# Patient Record
Sex: FEMALE | Race: WHITE | Employment: UNEMPLOYED | URBAN - METROPOLITAN AREA
[De-identification: names, ages, dates, MRNs, and addresses within clinical notes are randomized per-mention and may not be internally consistent; named-entity substitution may affect disease eponyms.]

---

## 2018-03-01 PROCEDURE — 86003 ALLG SPEC IGE CRUDE XTRC EA: CPT | Performed by: INTERNAL MEDICINE

## 2018-03-01 PROCEDURE — 36415 COLL VENOUS BLD VENIPUNCTURE: CPT | Performed by: INTERNAL MEDICINE

## 2018-03-13 PROBLEM — I10 ESSENTIAL HYPERTENSION: Status: ACTIVE | Noted: 2018-03-13

## 2018-07-16 PROCEDURE — 81241 F5 GENE: CPT | Performed by: INTERNAL MEDICINE

## 2018-07-16 PROCEDURE — 81240 F2 GENE: CPT | Performed by: INTERNAL MEDICINE

## 2018-07-16 PROCEDURE — 85705 THROMBOPLASTIN INHIBITION: CPT | Performed by: INTERNAL MEDICINE

## 2018-07-16 PROCEDURE — 86147 CARDIOLIPIN ANTIBODY EA IG: CPT | Performed by: INTERNAL MEDICINE

## 2018-07-16 PROCEDURE — 85613 RUSSELL VIPER VENOM DILUTED: CPT | Performed by: INTERNAL MEDICINE

## 2018-07-16 PROCEDURE — 85732 THROMBOPLASTIN TIME PARTIAL: CPT | Performed by: INTERNAL MEDICINE

## 2018-07-16 PROCEDURE — 86146 BETA-2 GLYCOPROTEIN ANTIBODY: CPT | Performed by: INTERNAL MEDICINE

## 2018-07-16 PROCEDURE — 85610 PROTHROMBIN TIME: CPT | Performed by: INTERNAL MEDICINE

## 2018-07-30 PROBLEM — I82.4Y2 ACUTE DEEP VEIN THROMBOSIS (DVT) OF PROXIMAL VEIN OF LEFT LOWER EXTREMITY (HCC): Status: ACTIVE | Noted: 2018-07-30

## 2018-10-15 PROCEDURE — 85732 THROMBOPLASTIN TIME PARTIAL: CPT | Performed by: INTERNAL MEDICINE

## 2018-10-15 PROCEDURE — 85705 THROMBOPLASTIN INHIBITION: CPT | Performed by: INTERNAL MEDICINE

## 2018-10-15 PROCEDURE — 85610 PROTHROMBIN TIME: CPT | Performed by: INTERNAL MEDICINE

## 2018-10-15 PROCEDURE — 36415 COLL VENOUS BLD VENIPUNCTURE: CPT | Performed by: INTERNAL MEDICINE

## 2018-10-15 PROCEDURE — 85613 RUSSELL VIPER VENOM DILUTED: CPT | Performed by: INTERNAL MEDICINE

## 2018-10-18 ENCOUNTER — HOSPITAL ENCOUNTER (OUTPATIENT)
Facility: HOSPITAL | Age: 53
Setting detail: HOSPITAL OUTPATIENT SURGERY
Discharge: HOME | End: 2018-10-18
Attending: SPECIALIST | Admitting: SPECIALIST
Payer: COMMERCIAL

## 2018-10-18 ENCOUNTER — ANESTHESIA EVENT (OUTPATIENT)
Dept: ENDOSCOPY | Facility: HOSPITAL | Age: 53
End: 2018-10-18

## 2018-10-18 ENCOUNTER — ANESTHESIA (OUTPATIENT)
Dept: ENDOSCOPY | Facility: HOSPITAL | Age: 53
End: 2018-10-18

## 2018-10-18 VITALS
DIASTOLIC BLOOD PRESSURE: 60 MMHG | TEMPERATURE: 98 F | HEART RATE: 69 BPM | WEIGHT: 293 LBS | HEIGHT: 68 IN | BODY MASS INDEX: 44.41 KG/M2 | OXYGEN SATURATION: 97 % | SYSTOLIC BLOOD PRESSURE: 100 MMHG | RESPIRATION RATE: 18 BRPM

## 2018-10-18 DIAGNOSIS — Z12.11 SCREEN FOR COLON CANCER: ICD-10-CM

## 2018-10-18 PROCEDURE — 88305 TISSUE EXAM BY PATHOLOGIST: CPT | Performed by: SPECIALIST

## 2018-10-18 PROCEDURE — 0DBN8ZX EXCISION OF SIGMOID COLON, VIA NATURAL OR ARTIFICIAL OPENING ENDOSCOPIC, DIAGNOSTIC: ICD-10-PCS | Performed by: SPECIALIST

## 2018-10-18 PROCEDURE — 0DBM8ZX EXCISION OF DESCENDING COLON, VIA NATURAL OR ARTIFICIAL OPENING ENDOSCOPIC, DIAGNOSTIC: ICD-10-PCS | Performed by: SPECIALIST

## 2018-10-18 RX ORDER — SODIUM CHLORIDE, SODIUM LACTATE, POTASSIUM CHLORIDE, CALCIUM CHLORIDE 600; 310; 30; 20 MG/100ML; MG/100ML; MG/100ML; MG/100ML
INJECTION, SOLUTION INTRAVENOUS CONTINUOUS
Status: DISCONTINUED | OUTPATIENT
Start: 2018-10-18 | End: 2018-10-18

## 2018-10-18 RX ORDER — NALOXONE HYDROCHLORIDE 0.4 MG/ML
80 INJECTION, SOLUTION INTRAMUSCULAR; INTRAVENOUS; SUBCUTANEOUS AS NEEDED
Status: DISCONTINUED | OUTPATIENT
Start: 2018-10-18 | End: 2018-10-18

## 2018-10-18 RX ORDER — ONDANSETRON 2 MG/ML
4 INJECTION INTRAMUSCULAR; INTRAVENOUS AS NEEDED
Status: DISCONTINUED | OUTPATIENT
Start: 2018-10-18 | End: 2018-10-18

## 2018-10-18 NOTE — BRIEF OP NOTE
Pre-Operative Diagnosis: Screen for colon cancer [Z12.11]     Post-Operative Diagnosis: colon polyps, diverticulosis, hemorrhoids      Procedure Performed:   Procedure(s):  colonoscopy with polpectomy    Surgeon(s) and Role:     Brandt Negro MD - Bessy Berrios

## 2018-10-18 NOTE — ANESTHESIA POSTPROCEDURE EVALUATION
[address] Patient Status:  Hospital Outpatient Surgery   Age/Gender 46year old female MRN [id number]   Location [address] [facility]. Attending Sae Stover MD   Hosp Day # 0 PCP Cesilia Saravia MD       Anesthesia Post-op

## 2018-10-18 NOTE — H&P
[address] Patient Status:  Hospital Outpatient Surgery    1965 MRN [id number]   [facility] ENDOSCOPY Attending Mandeep Blackwood MD   Hosp Day # 0 PCP Venkatesh Cuevas MD     Active Problems:    * No active hosp

## 2018-10-18 NOTE — ANESTHESIA PREPROCEDURE EVALUATION
PRE-OP EVALUATION    Patient Name: James Johnson    Pre-op Diagnosis: Screen for colon cancer [Z12.11]    Procedure(s):  colonoscopy    Surgeon(s) and Role:     Kiel Maxwell MD - Primary    Pre-op vitals reviewed.   Temp: 97.7 °F (36.5 °C)  Pulse: 1999    Heel spur removed left foot   • OTHER SURGICAL HISTORY Left 5/30/12    Left knee A&E w/PLM   • REPAIR ELBOW SAMANTHA/ATTCH OPEN Left 10/22/2015    Procedure: DEBRIDEMENT LATERAL EPICONDYLE  ELBOW;  Surgeon: Rhonda Adame MD;  Location: [facility]

## 2018-10-18 NOTE — OPERATIVE REPORT
ENDOSCOPY OPERATIVE REPORT    Patient Name:  Linh Donald Record #: [id number]  YOB: 1965  Date of Procedure: 10/18/2018    Preoperative Diagnosis: Screening    Postoperative Diagnosis:Colon Polyps precautions. IMPRESSION:  Colon Polyps  Diverticulosis  Hemorrhoids    PLAN:  Await Biopsy Results  Patient is to follow a high fiber, low fat diet     Mani Portillo.  Blanquita Stone MD

## 2018-10-21 NOTE — PROGRESS NOTES
10/21/2018  Dorcus Click Theresa  [address] [address]    Dear Maddy Rodriguez,       Here are the  biopsy/pathology findings from your recent Colonoscopy : Three small polyps were completely removed at the time of your colonoscopy.   These

## 2018-11-05 PROBLEM — G43.109 MIGRAINE WITH AURA AND WITHOUT STATUS MIGRAINOSUS, NOT INTRACTABLE: Status: ACTIVE | Noted: 2018-11-05

## 2018-12-07 PROBLEM — M17.12 PRIMARY OSTEOARTHRITIS OF LEFT KNEE: Status: ACTIVE | Noted: 2018-12-07

## 2019-01-24 PROCEDURE — 85305 CLOT INHIBIT PROT S TOTAL: CPT | Performed by: INTERNAL MEDICINE

## 2019-01-24 PROCEDURE — 85301 ANTITHROMBIN III ANTIGEN: CPT | Performed by: INTERNAL MEDICINE

## 2019-01-24 PROCEDURE — 85306 CLOT INHIBIT PROT S FREE: CPT | Performed by: INTERNAL MEDICINE

## 2019-01-24 PROCEDURE — 85303 CLOT INHIBIT PROT C ACTIVITY: CPT | Performed by: INTERNAL MEDICINE

## 2019-01-29 PROCEDURE — 86141 C-REACTIVE PROTEIN HS: CPT | Performed by: INTERNAL MEDICINE

## 2019-01-29 PROCEDURE — 82397 CHEMILUMINESCENT ASSAY: CPT | Performed by: INTERNAL MEDICINE

## 2019-03-05 PROCEDURE — 88175 CYTOPATH C/V AUTO FLUID REDO: CPT | Performed by: OBSTETRICS & GYNECOLOGY

## 2019-03-05 PROCEDURE — 87624 HPV HI-RISK TYP POOLED RSLT: CPT | Performed by: OBSTETRICS & GYNECOLOGY

## 2019-03-15 PROCEDURE — 88305 TISSUE EXAM BY PATHOLOGIST: CPT | Performed by: RADIOLOGY

## 2019-03-15 PROCEDURE — 88342 IMHCHEM/IMCYTCHM 1ST ANTB: CPT | Performed by: RADIOLOGY

## 2019-03-15 PROCEDURE — 88341 IMHCHEM/IMCYTCHM EA ADD ANTB: CPT | Performed by: RADIOLOGY

## 2019-04-15 ENCOUNTER — TELEPHONE (OUTPATIENT)
Dept: MAMMOGRAPHY | Facility: HOSPITAL | Age: 54
End: 2019-04-15

## 2019-04-15 NOTE — TELEPHONE ENCOUNTER
Keegan Messina called this RN back regarding needle localization process of breast for lumpectomy scheduled for Tuesday April 23, 2019 with Dr. Li March. Procedure explained and all questions answered.  Pt to be transported via W/C through [facility]

## 2019-04-23 ENCOUNTER — HOSPITAL ENCOUNTER (OUTPATIENT)
Facility: HOSPITAL | Age: 54
Setting detail: HOSPITAL OUTPATIENT SURGERY
Discharge: HOME | End: 2019-04-23
Attending: SURGERY | Admitting: SURGERY
Payer: COMMERCIAL

## 2019-04-23 ENCOUNTER — ANESTHESIA (OUTPATIENT)
Dept: SURGERY | Facility: HOSPITAL | Age: 54
End: 2019-04-23
Payer: COMMERCIAL

## 2019-04-23 ENCOUNTER — HOSPITAL ENCOUNTER (OUTPATIENT)
Dept: MAMMOGRAPHY | Facility: HOSPITAL | Age: 54
Discharge: HOME | End: 2019-04-23
Attending: SURGERY
Payer: COMMERCIAL

## 2019-04-23 ENCOUNTER — ANESTHESIA EVENT (OUTPATIENT)
Dept: SURGERY | Facility: HOSPITAL | Age: 54
End: 2019-04-23
Payer: COMMERCIAL

## 2019-04-23 VITALS
RESPIRATION RATE: 18 BRPM | SYSTOLIC BLOOD PRESSURE: 125 MMHG | BODY MASS INDEX: 44.41 KG/M2 | OXYGEN SATURATION: 100 % | DIASTOLIC BLOOD PRESSURE: 60 MMHG | WEIGHT: 293 LBS | TEMPERATURE: 98 F | HEIGHT: 68 IN | HEART RATE: 68 BPM

## 2019-04-23 DIAGNOSIS — R92.0 BREAST MICROCALCIFICATIONS: ICD-10-CM

## 2019-04-23 PROCEDURE — 81025 URINE PREGNANCY TEST: CPT | Performed by: SURGERY

## 2019-04-23 PROCEDURE — 19281 PERQ DEVICE BREAST 1ST IMAG: CPT | Performed by: SURGERY

## 2019-04-23 PROCEDURE — 0HBU0ZX EXCISION OF LEFT BREAST, OPEN APPROACH, DIAGNOSTIC: ICD-10-PCS | Performed by: SURGERY

## 2019-04-23 PROCEDURE — 76098 X-RAY EXAM SURGICAL SPECIMEN: CPT | Performed by: SURGERY

## 2019-04-23 PROCEDURE — 88307 TISSUE EXAM BY PATHOLOGIST: CPT | Performed by: SURGERY

## 2019-04-23 PROCEDURE — 19282 PERQ DEVICE BREAST EA IMAG: CPT | Performed by: SURGERY

## 2019-04-23 RX ORDER — DIAZEPAM 5 MG/1
5 TABLET ORAL AS NEEDED
Status: DISCONTINUED | OUTPATIENT
Start: 2019-04-23 | End: 2019-04-23 | Stop reason: HOSPADM

## 2019-04-23 RX ORDER — ONDANSETRON 2 MG/ML
INJECTION INTRAMUSCULAR; INTRAVENOUS
Status: DISCONTINUED | OUTPATIENT
Start: 2019-04-23 | End: 2019-04-23 | Stop reason: HOSPADM

## 2019-04-23 RX ORDER — ONDANSETRON 2 MG/ML
4 INJECTION INTRAMUSCULAR; INTRAVENOUS AS NEEDED
Status: DISCONTINUED | OUTPATIENT
Start: 2019-04-23 | End: 2019-04-23

## 2019-04-23 RX ORDER — HYDROMORPHONE HYDROCHLORIDE 1 MG/ML
0.4 INJECTION, SOLUTION INTRAMUSCULAR; INTRAVENOUS; SUBCUTANEOUS EVERY 5 MIN PRN
Status: DISCONTINUED | OUTPATIENT
Start: 2019-04-23 | End: 2019-04-23

## 2019-04-23 RX ORDER — HYDROCODONE BITARTRATE AND ACETAMINOPHEN 5; 325 MG/1; MG/1
1-2 TABLET ORAL EVERY 6 HOURS PRN
Qty: 20 TABLET | Refills: 0 | Status: SHIPPED | OUTPATIENT
Start: 2019-04-23 | End: 2019-04-26 | Stop reason: ALTCHOICE

## 2019-04-23 RX ORDER — SODIUM CHLORIDE, SODIUM LACTATE, POTASSIUM CHLORIDE, CALCIUM CHLORIDE 600; 310; 30; 20 MG/100ML; MG/100ML; MG/100ML; MG/100ML
INJECTION, SOLUTION INTRAVENOUS CONTINUOUS
Status: DISCONTINUED | OUTPATIENT
Start: 2019-04-23 | End: 2019-04-23

## 2019-04-23 RX ORDER — HYDROCODONE BITARTRATE AND ACETAMINOPHEN 5; 325 MG/1; MG/1
2 TABLET ORAL AS NEEDED
Status: DISCONTINUED | OUTPATIENT
Start: 2019-04-23 | End: 2019-04-23

## 2019-04-23 RX ORDER — MIDAZOLAM HYDROCHLORIDE 1 MG/ML
1 INJECTION INTRAMUSCULAR; INTRAVENOUS EVERY 5 MIN PRN
Status: DISCONTINUED | OUTPATIENT
Start: 2019-04-23 | End: 2019-04-23

## 2019-04-23 RX ORDER — NALOXONE HYDROCHLORIDE 0.4 MG/ML
80 INJECTION, SOLUTION INTRAMUSCULAR; INTRAVENOUS; SUBCUTANEOUS AS NEEDED
Status: DISCONTINUED | OUTPATIENT
Start: 2019-04-23 | End: 2019-04-23

## 2019-04-23 RX ORDER — METOCLOPRAMIDE HYDROCHLORIDE 5 MG/ML
INJECTION INTRAMUSCULAR; INTRAVENOUS
Status: DISCONTINUED | OUTPATIENT
Start: 2019-04-23 | End: 2019-04-23 | Stop reason: HOSPADM

## 2019-04-23 RX ORDER — ACETAMINOPHEN 500 MG
1000 TABLET ORAL ONCE
COMMUNITY
End: 2019-12-05 | Stop reason: ALTCHOICE

## 2019-04-23 RX ORDER — SCOLOPAMINE TRANSDERMAL SYSTEM 1 MG/1
1 PATCH, EXTENDED RELEASE TRANSDERMAL
Status: DISCONTINUED | OUTPATIENT
Start: 2019-04-23 | End: 2019-04-23

## 2019-04-23 RX ORDER — ACETAMINOPHEN 500 MG
1000 TABLET ORAL ONCE
Status: DISCONTINUED | OUTPATIENT
Start: 2019-04-23 | End: 2019-04-23

## 2019-04-23 RX ORDER — BUPIVACAINE HYDROCHLORIDE 5 MG/ML
INJECTION, SOLUTION EPIDURAL; INTRACAUDAL AS NEEDED
Status: DISCONTINUED | OUTPATIENT
Start: 2019-04-23 | End: 2019-04-23 | Stop reason: HOSPADM

## 2019-04-23 RX ORDER — HYDROCODONE BITARTRATE AND ACETAMINOPHEN 5; 325 MG/1; MG/1
1 TABLET ORAL AS NEEDED
Status: DISCONTINUED | OUTPATIENT
Start: 2019-04-23 | End: 2019-04-23

## 2019-04-23 RX ORDER — DEXAMETHASONE SODIUM PHOSPHATE 4 MG/ML
VIAL (ML) INJECTION
Status: DISCONTINUED | OUTPATIENT
Start: 2019-04-23 | End: 2019-04-23 | Stop reason: HOSPADM

## 2019-04-23 NOTE — BRIEF OP NOTE
Pre-Operative Diagnosis: Breast microcalcifications [R92.0]     Post-Operative Diagnosis: SAME AS ABOVE     Procedure Performed:   LEFT BREAST NEEDLE LOCALIZATION LUMPECTOMY TIMES TWO SITES AND USE OF FAXITRON    Surgeon(s) and Role:     Elizabeth Ceron,

## 2019-04-23 NOTE — OPERATIVE REPORT
[address]    PATIENT'S NAME: Mary Grace Connolly   ATTENDING PHYSICIAN: José Antonio Coughlin M.D. OPERATING PHYSICIAN: José Antonio Coughlin M.D.    PATIENT ACCOUNT#:   [de-identified]    LOCATION:  [address] [place] [address]  MEDICAL RECORD #:   [id number]       DATE breast was fatty and most likely 1 of the clips was dislodged as it was being removed, and both wires were in place within the tissue. In the lumpectomy cavity the small bleeders were cauterized.   Good hemostasis noted, and after irrigation was complete,

## 2019-04-23 NOTE — ANESTHESIA POSTPROCEDURE EVALUATION
[address] Patient Status:  Hospital Outpatient Surgery   Age/Gender 53year old female MRN [id number]   [facility] SURGERY Attending Marek Ortiz MD   Hosp Day # 0 PCP Corrinne Endow, MD       Anesthesia Post-op N

## 2019-04-23 NOTE — IMAGING NOTE
Assisted Dr. Ming Cid with 2 site LB needle localization for breast for lumpectomy. Procedure explained and all questions answered. Pt verbalized understanding. Emotional support provided and pt tolerated procedure well with minimal discomfort.  Gauze dressin

## 2019-04-23 NOTE — ANESTHESIA PREPROCEDURE EVALUATION
PRE-OP EVALUATION    Patient Name: Kieran Hale    Pre-op Diagnosis: LEFT BREAST MICROCALCIFICATIONS    Procedure(s):  LEFT BREAST NEEDLE LOC LUMPECTOMIES X 2    Surgeon(s) and Role:     Javon Cohn MD - Primary    Pre-op vitals reviewed.   Temp: (+) hyperlipidemia                                  Endo/Other                     (+) clotting disorder (lupus anticoagulant)      (+) arthritis       Pulmonary  Comment: Former smoker               Recent URI: chronic rhintis.    (+) sleep apnea and CPAP Pulmonary      Breath sounds clear to auscultation bilaterally. (+) decreased breath sounds         Other findings  ANXIOUS/NAUSEOUS  Vasovagal w IV placement          ASA: 3   Plan: general  NPO status verified and patient meets guidelines.         C

## 2019-04-23 NOTE — H&P
The above referenced H&P in the office on 4/8/2019 by Dr. Davi Joaquin was reviewed by Enedina Jones MD on 4/23/2019, the patient was examined and no significant changes have occurred in the patient's condition since the H&P was performed.   Risks and benefits were

## 2020-03-24 PROBLEM — D68.62 LUPUS ANTICOAGULANT DISORDER (HCC): Status: ACTIVE | Noted: 2020-03-24

## 2020-05-13 PROCEDURE — 88305 TISSUE EXAM BY PATHOLOGIST: CPT | Performed by: RADIOLOGY

## 2020-08-28 PROCEDURE — 88305 TISSUE EXAM BY PATHOLOGIST: CPT | Performed by: RADIOLOGY

## 2020-08-28 PROCEDURE — 88344 IMHCHEM/IMCYTCHM EA MLT ANTB: CPT | Performed by: RADIOLOGY

## 2021-08-02 PROBLEM — N20.0 LEFT RENAL STONE: Status: ACTIVE | Noted: 2021-08-02

## 2022-04-11 ENCOUNTER — HOSPITAL ENCOUNTER (OUTPATIENT)
Dept: PHYSICAL THERAPY | Facility: HOSPITAL | Age: 57
Discharge: HOME | End: 2022-04-11
Attending: ORTHOPAEDIC SURGERY
Payer: COMMERCIAL

## 2022-04-11 ENCOUNTER — HOSPITAL ENCOUNTER (OUTPATIENT)
Dept: LAB | Facility: HOSPITAL | Age: 57
Discharge: HOME | End: 2022-04-11
Attending: ORTHOPAEDIC SURGERY
Payer: COMMERCIAL

## 2022-04-11 DIAGNOSIS — M17.12 OSTEOARTHRITIS OF LEFT KNEE: ICD-10-CM

## 2022-04-11 LAB
ANTIBODY SCREEN: NEGATIVE
RH BLOOD TYPE: NEGATIVE

## 2022-04-11 PROCEDURE — 86900 BLOOD TYPING SEROLOGIC ABO: CPT | Performed by: ORTHOPAEDIC SURGERY

## 2022-04-11 PROCEDURE — 87081 CULTURE SCREEN ONLY: CPT | Performed by: ORTHOPAEDIC SURGERY

## 2022-04-11 PROCEDURE — 86901 BLOOD TYPING SEROLOGIC RH(D): CPT | Performed by: ORTHOPAEDIC SURGERY

## 2022-04-11 PROCEDURE — 86850 RBC ANTIBODY SCREEN: CPT | Performed by: ORTHOPAEDIC SURGERY

## 2022-04-19 ENCOUNTER — LAB ENCOUNTER (OUTPATIENT)
Dept: LAB | Facility: HOSPITAL | Age: 57
End: 2022-04-19
Attending: ORTHOPAEDIC SURGERY
Payer: COMMERCIAL

## 2022-04-19 DIAGNOSIS — M17.12 OSTEOARTHRITIS OF LEFT KNEE: ICD-10-CM

## 2022-04-19 LAB — SARS-COV-2 RNA RESP QL NAA+PROBE: NOT DETECTED

## 2022-04-20 RX ORDER — TRANEXAMIC ACID 10 MG/ML
1000 INJECTION, SOLUTION INTRAVENOUS
Status: DISCONTINUED | OUTPATIENT
Start: 2022-04-21 | End: 2022-04-21

## 2022-04-21 ENCOUNTER — HOSPITAL ENCOUNTER (OUTPATIENT)
Facility: HOSPITAL | Age: 57
Discharge: HOME HEALTH | End: 2022-04-22
Attending: ORTHOPAEDIC SURGERY | Admitting: ORTHOPAEDIC SURGERY
Payer: COMMERCIAL

## 2022-04-21 ENCOUNTER — APPOINTMENT (OUTPATIENT)
Dept: GENERAL RADIOLOGY | Facility: HOSPITAL | Age: 57
End: 2022-04-21
Attending: ORTHOPAEDIC SURGERY
Payer: COMMERCIAL

## 2022-04-21 ENCOUNTER — ANESTHESIA EVENT (OUTPATIENT)
Dept: SURGERY | Facility: HOSPITAL | Age: 57
End: 2022-04-21
Payer: COMMERCIAL

## 2022-04-21 ENCOUNTER — ANESTHESIA (OUTPATIENT)
Dept: SURGERY | Facility: HOSPITAL | Age: 57
End: 2022-04-21
Payer: COMMERCIAL

## 2022-04-21 DIAGNOSIS — M17.12 OSTEOARTHRITIS OF LEFT KNEE: Primary | ICD-10-CM

## 2022-04-21 DIAGNOSIS — M17.12 PRIMARY OSTEOARTHRITIS OF LEFT KNEE: ICD-10-CM

## 2022-04-21 PROCEDURE — 0SRD0J9 REPLACEMENT OF LEFT KNEE JOINT WITH SYNTHETIC SUBSTITUTE, CEMENTED, OPEN APPROACH: ICD-10-PCS | Performed by: ORTHOPAEDIC SURGERY

## 2022-04-21 PROCEDURE — 76942 ECHO GUIDE FOR BIOPSY: CPT | Performed by: STUDENT IN AN ORGANIZED HEALTH CARE EDUCATION/TRAINING PROGRAM

## 2022-04-21 PROCEDURE — 73560 X-RAY EXAM OF KNEE 1 OR 2: CPT | Performed by: ORTHOPAEDIC SURGERY

## 2022-04-21 DEVICE — ATTUNE PATELLA MEDIALIZED ANATOMIC 32MM CEMENTED AOX
Type: IMPLANTABLE DEVICE | Site: KNEE | Status: FUNCTIONAL
Brand: ATTUNE

## 2022-04-21 DEVICE — SMARTSET GHV GENTAMICIN HIGH VISCOSITY BONE CEMENT 40G
Type: IMPLANTABLE DEVICE | Site: KNEE | Status: FUNCTIONAL
Brand: SMARTSET

## 2022-04-21 DEVICE — ATTUNE KNEE SYSTEM TIBIAL INSERT ROTATING PLATFORM POSTERIOR STABILIZED 5 7MM AOX
Type: IMPLANTABLE DEVICE | Site: KNEE | Status: FUNCTIONAL
Brand: ATTUNE

## 2022-04-21 DEVICE — ATTUNE KNEE SYSTEM TIBIAL BASE ROTATING PLATFORM SIZE 5 CEMENTED
Type: IMPLANTABLE DEVICE | Site: KNEE | Status: FUNCTIONAL
Brand: ATTUNE

## 2022-04-21 DEVICE — ATTUNE KNEE SYSTEM FEMORAL POSTERIOR STABILIZED SIZE 5 LEFT CEMENTED
Type: IMPLANTABLE DEVICE | Site: KNEE | Status: FUNCTIONAL
Brand: ATTUNE

## 2022-04-21 RX ORDER — HYDROMORPHONE HYDROCHLORIDE 1 MG/ML
0.4 INJECTION, SOLUTION INTRAMUSCULAR; INTRAVENOUS; SUBCUTANEOUS EVERY 2 HOUR PRN
Status: DISCONTINUED | OUTPATIENT
Start: 2022-04-21 | End: 2022-04-22

## 2022-04-21 RX ORDER — DEXAMETHASONE SODIUM PHOSPHATE 4 MG/ML
VIAL (ML) INJECTION AS NEEDED
Status: DISCONTINUED | OUTPATIENT
Start: 2022-04-21 | End: 2022-04-21 | Stop reason: SURG

## 2022-04-21 RX ORDER — MELATONIN
325
Status: DISCONTINUED | OUTPATIENT
Start: 2022-04-22 | End: 2022-04-22

## 2022-04-21 RX ORDER — HYDROCODONE BITARTRATE AND ACETAMINOPHEN 5; 325 MG/1; MG/1
1 TABLET ORAL AS NEEDED
Status: DISCONTINUED | OUTPATIENT
Start: 2022-04-21 | End: 2022-04-21 | Stop reason: HOSPADM

## 2022-04-21 RX ORDER — POLYETHYLENE GLYCOL 3350 17 G/17G
17 POWDER, FOR SOLUTION ORAL DAILY PRN
Status: DISCONTINUED | OUTPATIENT
Start: 2022-04-21 | End: 2022-04-22

## 2022-04-21 RX ORDER — MIDAZOLAM HYDROCHLORIDE 1 MG/ML
1 INJECTION INTRAMUSCULAR; INTRAVENOUS EVERY 5 MIN PRN
Status: DISCONTINUED | OUTPATIENT
Start: 2022-04-21 | End: 2022-04-21 | Stop reason: HOSPADM

## 2022-04-21 RX ORDER — SENNOSIDES 8.6 MG
17.2 TABLET ORAL NIGHTLY
Status: DISCONTINUED | OUTPATIENT
Start: 2022-04-21 | End: 2022-04-22

## 2022-04-21 RX ORDER — TRAMADOL HYDROCHLORIDE 50 MG/1
50 TABLET ORAL EVERY 6 HOURS
Status: DISCONTINUED | OUTPATIENT
Start: 2022-04-21 | End: 2022-04-22

## 2022-04-21 RX ORDER — TRANEXAMIC ACID 10 MG/ML
1000 INJECTION, SOLUTION INTRAVENOUS ONCE
Status: DISCONTINUED | OUTPATIENT
Start: 2022-04-21 | End: 2022-04-21 | Stop reason: HOSPADM

## 2022-04-21 RX ORDER — KETOROLAC TROMETHAMINE 30 MG/ML
30 INJECTION, SOLUTION INTRAMUSCULAR; INTRAVENOUS EVERY 6 HOURS
Status: COMPLETED | OUTPATIENT
Start: 2022-04-21 | End: 2022-04-22

## 2022-04-21 RX ORDER — OXYCODONE HYDROCHLORIDE 5 MG/1
2.5 TABLET ORAL EVERY 4 HOURS PRN
Status: DISCONTINUED | OUTPATIENT
Start: 2022-04-21 | End: 2022-04-22

## 2022-04-21 RX ORDER — METOCLOPRAMIDE HYDROCHLORIDE 5 MG/ML
10 INJECTION INTRAMUSCULAR; INTRAVENOUS AS NEEDED
Status: DISCONTINUED | OUTPATIENT
Start: 2022-04-21 | End: 2022-04-21 | Stop reason: HOSPADM

## 2022-04-21 RX ORDER — BUPRENORPHINE HYDROCHLORIDE 0.32 MG/ML
INJECTION INTRAMUSCULAR; INTRAVENOUS AS NEEDED
Status: DISCONTINUED | OUTPATIENT
Start: 2022-04-21 | End: 2022-04-21 | Stop reason: SURG

## 2022-04-21 RX ORDER — BUPROPION HYDROCHLORIDE 100 MG/1
200 TABLET ORAL 2 TIMES DAILY
Status: DISCONTINUED | OUTPATIENT
Start: 2022-04-21 | End: 2022-04-22

## 2022-04-21 RX ORDER — DIPHENHYDRAMINE HYDROCHLORIDE 50 MG/ML
12.5 INJECTION INTRAMUSCULAR; INTRAVENOUS EVERY 4 HOURS PRN
Status: DISCONTINUED | OUTPATIENT
Start: 2022-04-21 | End: 2022-04-22

## 2022-04-21 RX ORDER — TIZANIDINE 2 MG/1
2 TABLET ORAL EVERY 6 HOURS PRN
Status: DISCONTINUED | OUTPATIENT
Start: 2022-04-21 | End: 2022-04-22

## 2022-04-21 RX ORDER — DOCUSATE SODIUM 100 MG/1
100 CAPSULE, LIQUID FILLED ORAL 2 TIMES DAILY
Status: DISCONTINUED | OUTPATIENT
Start: 2022-04-21 | End: 2022-04-22

## 2022-04-21 RX ORDER — DEXAMETHASONE SODIUM PHOSPHATE 10 MG/ML
INJECTION, SOLUTION INTRAMUSCULAR; INTRAVENOUS AS NEEDED
Status: DISCONTINUED | OUTPATIENT
Start: 2022-04-21 | End: 2022-04-21 | Stop reason: SURG

## 2022-04-21 RX ORDER — BUPIVACAINE HYDROCHLORIDE 2.5 MG/ML
INJECTION, SOLUTION EPIDURAL; INFILTRATION; INTRACAUDAL AS NEEDED
Status: DISCONTINUED | OUTPATIENT
Start: 2022-04-21 | End: 2022-04-21 | Stop reason: SURG

## 2022-04-21 RX ORDER — CEFAZOLIN SODIUM/WATER 2 G/20 ML
2 SYRINGE (ML) INTRAVENOUS ONCE
Status: COMPLETED | OUTPATIENT
Start: 2022-04-21 | End: 2022-04-21

## 2022-04-21 RX ORDER — ACETAMINOPHEN 500 MG
1000 TABLET ORAL ONCE
Status: DISCONTINUED | OUTPATIENT
Start: 2022-04-21 | End: 2022-04-21 | Stop reason: HOSPADM

## 2022-04-21 RX ORDER — HYDROMORPHONE HYDROCHLORIDE 1 MG/ML
INJECTION, SOLUTION INTRAMUSCULAR; INTRAVENOUS; SUBCUTANEOUS
Status: COMPLETED
Start: 2022-04-21 | End: 2022-04-21

## 2022-04-21 RX ORDER — HYDROMORPHONE HYDROCHLORIDE 1 MG/ML
0.2 INJECTION, SOLUTION INTRAMUSCULAR; INTRAVENOUS; SUBCUTANEOUS EVERY 2 HOUR PRN
Status: DISCONTINUED | OUTPATIENT
Start: 2022-04-21 | End: 2022-04-22

## 2022-04-21 RX ORDER — ZOLPIDEM TARTRATE 5 MG/1
5 TABLET ORAL NIGHTLY PRN
Status: DISCONTINUED | OUTPATIENT
Start: 2022-04-21 | End: 2022-04-22

## 2022-04-21 RX ORDER — BUPIVACAINE HYDROCHLORIDE 7.5 MG/ML
INJECTION, SOLUTION INTRASPINAL AS NEEDED
Status: DISCONTINUED | OUTPATIENT
Start: 2022-04-21 | End: 2022-04-21 | Stop reason: SURG

## 2022-04-21 RX ORDER — MIDAZOLAM HYDROCHLORIDE 1 MG/ML
INJECTION INTRAMUSCULAR; INTRAVENOUS
Status: COMPLETED
Start: 2022-04-21 | End: 2022-04-21

## 2022-04-21 RX ORDER — ONDANSETRON 2 MG/ML
INJECTION INTRAMUSCULAR; INTRAVENOUS AS NEEDED
Status: DISCONTINUED | OUTPATIENT
Start: 2022-04-21 | End: 2022-04-21 | Stop reason: SURG

## 2022-04-21 RX ORDER — ONDANSETRON 2 MG/ML
4 INJECTION INTRAMUSCULAR; INTRAVENOUS AS NEEDED
Status: DISCONTINUED | OUTPATIENT
Start: 2022-04-21 | End: 2022-04-21 | Stop reason: HOSPADM

## 2022-04-21 RX ORDER — HYDROCODONE BITARTRATE AND ACETAMINOPHEN 5; 325 MG/1; MG/1
2 TABLET ORAL AS NEEDED
Status: DISCONTINUED | OUTPATIENT
Start: 2022-04-21 | End: 2022-04-21 | Stop reason: HOSPADM

## 2022-04-21 RX ORDER — OXYCODONE HYDROCHLORIDE 5 MG/1
1 TABLET ORAL EVERY 4 HOURS PRN
COMMUNITY
Start: 2022-04-12

## 2022-04-21 RX ORDER — DEXAMETHASONE SODIUM PHOSPHATE 4 MG/ML
4 VIAL (ML) INJECTION AS NEEDED
Status: DISCONTINUED | OUTPATIENT
Start: 2022-04-21 | End: 2022-04-21 | Stop reason: HOSPADM

## 2022-04-21 RX ORDER — PROCHLORPERAZINE EDISYLATE 5 MG/ML
10 INJECTION INTRAMUSCULAR; INTRAVENOUS EVERY 6 HOURS PRN
Status: DISCONTINUED | OUTPATIENT
Start: 2022-04-21 | End: 2022-04-22

## 2022-04-21 RX ORDER — PSEUDOEPHEDRINE HCL 30 MG
100 TABLET ORAL 2 TIMES DAILY
COMMUNITY
Start: 2022-04-12

## 2022-04-21 RX ORDER — SODIUM PHOSPHATE, DIBASIC AND SODIUM PHOSPHATE, MONOBASIC 7; 19 G/133ML; G/133ML
1 ENEMA RECTAL ONCE AS NEEDED
Status: DISCONTINUED | OUTPATIENT
Start: 2022-04-21 | End: 2022-04-22

## 2022-04-21 RX ORDER — SODIUM CHLORIDE, SODIUM LACTATE, POTASSIUM CHLORIDE, CALCIUM CHLORIDE 600; 310; 30; 20 MG/100ML; MG/100ML; MG/100ML; MG/100ML
INJECTION, SOLUTION INTRAVENOUS CONTINUOUS
Status: DISCONTINUED | OUTPATIENT
Start: 2022-04-21 | End: 2022-04-22

## 2022-04-21 RX ORDER — ACETAMINOPHEN 500 MG
1000 TABLET ORAL ONCE AS NEEDED
Status: COMPLETED | OUTPATIENT
Start: 2022-04-21 | End: 2022-04-21

## 2022-04-21 RX ORDER — PSEUDOEPHED/ACETAMINOPH/DIPHEN 30MG-500MG
500 TABLET ORAL EVERY 6 HOURS PRN
COMMUNITY
Start: 2022-04-12

## 2022-04-21 RX ORDER — ROSUVASTATIN CALCIUM 20 MG/1
20 TABLET, COATED ORAL DAILY
Status: DISCONTINUED | OUTPATIENT
Start: 2022-04-21 | End: 2022-04-22

## 2022-04-21 RX ORDER — DIPHENHYDRAMINE HCL 25 MG
25 CAPSULE ORAL EVERY 4 HOURS PRN
Status: DISCONTINUED | OUTPATIENT
Start: 2022-04-21 | End: 2022-04-22

## 2022-04-21 RX ORDER — FERROUS SULFATE 325(65) MG
325 TABLET ORAL DAILY
COMMUNITY
Start: 2022-04-12

## 2022-04-21 RX ORDER — CEFAZOLIN SODIUM 1 G/3ML
INJECTION, POWDER, FOR SOLUTION INTRAMUSCULAR; INTRAVENOUS AS NEEDED
Status: DISCONTINUED | OUTPATIENT
Start: 2022-04-21 | End: 2022-04-21 | Stop reason: SURG

## 2022-04-21 RX ORDER — MEPERIDINE HYDROCHLORIDE 25 MG/ML
12.5 INJECTION INTRAMUSCULAR; INTRAVENOUS; SUBCUTANEOUS AS NEEDED
Status: DISCONTINUED | OUTPATIENT
Start: 2022-04-21 | End: 2022-04-21 | Stop reason: HOSPADM

## 2022-04-21 RX ORDER — BISACODYL 10 MG
10 SUPPOSITORY, RECTAL RECTAL
Status: DISCONTINUED | OUTPATIENT
Start: 2022-04-21 | End: 2022-04-22

## 2022-04-21 RX ORDER — OXYCODONE HYDROCHLORIDE 5 MG/1
5 TABLET ORAL EVERY 4 HOURS PRN
Status: DISCONTINUED | OUTPATIENT
Start: 2022-04-21 | End: 2022-04-22

## 2022-04-21 RX ORDER — LIDOCAINE HYDROCHLORIDE 10 MG/ML
INJECTION, SOLUTION EPIDURAL; INFILTRATION; INTRACAUDAL; PERINEURAL AS NEEDED
Status: DISCONTINUED | OUTPATIENT
Start: 2022-04-21 | End: 2022-04-21 | Stop reason: SURG

## 2022-04-21 RX ORDER — HYDROMORPHONE HYDROCHLORIDE 1 MG/ML
0.5 INJECTION, SOLUTION INTRAMUSCULAR; INTRAVENOUS; SUBCUTANEOUS EVERY 5 MIN PRN
Status: DISCONTINUED | OUTPATIENT
Start: 2022-04-21 | End: 2022-04-21 | Stop reason: HOSPADM

## 2022-04-21 RX ORDER — DEXAMETHASONE SODIUM PHOSPHATE 10 MG/ML
8 INJECTION, SOLUTION INTRAMUSCULAR; INTRAVENOUS ONCE
Status: COMPLETED | OUTPATIENT
Start: 2022-04-22 | End: 2022-04-22

## 2022-04-21 RX ORDER — LABETALOL HYDROCHLORIDE 5 MG/ML
5 INJECTION, SOLUTION INTRAVENOUS EVERY 5 MIN PRN
Status: DISCONTINUED | OUTPATIENT
Start: 2022-04-21 | End: 2022-04-21 | Stop reason: HOSPADM

## 2022-04-21 RX ORDER — SODIUM CHLORIDE 9 MG/ML
INJECTION, SOLUTION INTRAVENOUS CONTINUOUS
Status: DISCONTINUED | OUTPATIENT
Start: 2022-04-21 | End: 2022-04-22

## 2022-04-21 RX ORDER — MIDAZOLAM HYDROCHLORIDE 1 MG/ML
INJECTION INTRAMUSCULAR; INTRAVENOUS AS NEEDED
Status: DISCONTINUED | OUTPATIENT
Start: 2022-04-21 | End: 2022-04-21 | Stop reason: SURG

## 2022-04-21 RX ORDER — DIPHENHYDRAMINE HYDROCHLORIDE 50 MG/ML
25 INJECTION INTRAMUSCULAR; INTRAVENOUS ONCE AS NEEDED
Status: ACTIVE | OUTPATIENT
Start: 2022-04-21 | End: 2022-04-21

## 2022-04-21 RX ORDER — DIPHENHYDRAMINE HYDROCHLORIDE 50 MG/ML
12.5 INJECTION INTRAMUSCULAR; INTRAVENOUS AS NEEDED
Status: DISCONTINUED | OUTPATIENT
Start: 2022-04-21 | End: 2022-04-21 | Stop reason: HOSPADM

## 2022-04-21 RX ORDER — SODIUM CHLORIDE, SODIUM LACTATE, POTASSIUM CHLORIDE, CALCIUM CHLORIDE 600; 310; 30; 20 MG/100ML; MG/100ML; MG/100ML; MG/100ML
INJECTION, SOLUTION INTRAVENOUS CONTINUOUS
Status: DISCONTINUED | OUTPATIENT
Start: 2022-04-21 | End: 2022-04-21 | Stop reason: HOSPADM

## 2022-04-21 RX ORDER — ONDANSETRON 2 MG/ML
4 INJECTION INTRAMUSCULAR; INTRAVENOUS EVERY 4 HOURS PRN
Status: DISCONTINUED | OUTPATIENT
Start: 2022-04-21 | End: 2022-04-22

## 2022-04-21 RX ORDER — ACETAMINOPHEN 325 MG/1
650 TABLET ORAL 4 TIMES DAILY
Status: DISCONTINUED | OUTPATIENT
Start: 2022-04-21 | End: 2022-04-22

## 2022-04-21 RX ORDER — NALOXONE HYDROCHLORIDE 0.4 MG/ML
80 INJECTION, SOLUTION INTRAMUSCULAR; INTRAVENOUS; SUBCUTANEOUS AS NEEDED
Status: DISCONTINUED | OUTPATIENT
Start: 2022-04-21 | End: 2022-04-21 | Stop reason: HOSPADM

## 2022-04-21 RX ORDER — KETAMINE HYDROCHLORIDE 50 MG/ML
INJECTION, SOLUTION, CONCENTRATE INTRAMUSCULAR; INTRAVENOUS AS NEEDED
Status: DISCONTINUED | OUTPATIENT
Start: 2022-04-21 | End: 2022-04-21 | Stop reason: SURG

## 2022-04-21 RX ORDER — ASPIRIN 325 MG
325 TABLET ORAL 2 TIMES DAILY
Status: DISCONTINUED | OUTPATIENT
Start: 2022-04-21 | End: 2022-04-22

## 2022-04-21 RX ORDER — ACETAMINOPHEN 325 MG/1
TABLET ORAL
Status: COMPLETED
Start: 2022-04-21 | End: 2022-04-21

## 2022-04-21 RX ADMIN — ONDANSETRON 4 MG: 2 INJECTION INTRAMUSCULAR; INTRAVENOUS at 12:41:00

## 2022-04-21 RX ADMIN — KETAMINE HYDROCHLORIDE 30 MG: 50 INJECTION, SOLUTION, CONCENTRATE INTRAMUSCULAR; INTRAVENOUS at 12:15:00

## 2022-04-21 RX ADMIN — SODIUM CHLORIDE, SODIUM LACTATE, POTASSIUM CHLORIDE, CALCIUM CHLORIDE: 600; 310; 30; 20 INJECTION, SOLUTION INTRAVENOUS at 14:10:00

## 2022-04-21 RX ADMIN — SODIUM CHLORIDE, SODIUM LACTATE, POTASSIUM CHLORIDE, CALCIUM CHLORIDE: 600; 310; 30; 20 INJECTION, SOLUTION INTRAVENOUS at 13:58:00

## 2022-04-21 RX ADMIN — BUPIVACAINE HYDROCHLORIDE 20 ML: 2.5 INJECTION, SOLUTION EPIDURAL; INFILTRATION; INTRACAUDAL at 12:08:00

## 2022-04-21 RX ADMIN — LIDOCAINE HYDROCHLORIDE 5 ML: 10 INJECTION, SOLUTION EPIDURAL; INFILTRATION; INTRACAUDAL; PERINEURAL at 12:09:00

## 2022-04-21 RX ADMIN — DEXAMETHASONE SODIUM PHOSPHATE 2 MG: 10 INJECTION, SOLUTION INTRAMUSCULAR; INTRAVENOUS at 12:08:00

## 2022-04-21 RX ADMIN — BUPIVACAINE HYDROCHLORIDE 2 ML: 7.5 INJECTION, SOLUTION INTRASPINAL at 12:02:00

## 2022-04-21 RX ADMIN — CEFAZOLIN SODIUM 1 G: 1 INJECTION, POWDER, FOR SOLUTION INTRAMUSCULAR; INTRAVENOUS at 12:17:00

## 2022-04-21 RX ADMIN — DEXAMETHASONE SODIUM PHOSPHATE 4 MG: 4 MG/ML VIAL (ML) INJECTION at 12:15:00

## 2022-04-21 RX ADMIN — SODIUM CHLORIDE, SODIUM LACTATE, POTASSIUM CHLORIDE, CALCIUM CHLORIDE: 600; 310; 30; 20 INJECTION, SOLUTION INTRAVENOUS at 11:53:00

## 2022-04-21 RX ADMIN — CEFAZOLIN SODIUM/WATER 2 G: 2 G/20 ML SYRINGE (ML) INTRAVENOUS at 12:13:00

## 2022-04-21 RX ADMIN — BUPRENORPHINE HYDROCHLORIDE 150 MCG: 0.32 INJECTION INTRAMUSCULAR; INTRAVENOUS at 12:08:00

## 2022-04-21 RX ADMIN — MIDAZOLAM HYDROCHLORIDE 2 MG: 1 INJECTION INTRAMUSCULAR; INTRAVENOUS at 11:54:00

## 2022-04-21 NOTE — BRIEF OP NOTE
Pre-Operative Diagnosis: Primary osteoarthritis of left knee [M17.12]     Post-Operative Diagnosis: Primary osteoarthritis of left knee [M17.12]      Procedure Performed:   LEFT TOTAL KNEE ARTHROPLASTY    Surgeon(s) and Role:     * Barbra Hernandez MD - Primary    Assistant(s):  PA:  Geo Pastor PA-C     Surgical Findings: OA     Specimen: bone     Estimated Blood Loss: Blood Output: 20 mL (4/21/2022  1:35 PM)      Dictation Number:  Radha Fraser MD  4/21/2022  2:06 PM

## 2022-04-21 NOTE — ANESTHESIA PROCEDURE NOTES
Spinal Block  Performed by: Alex Bee MD  Authorized by: Alex Bee MD       General Information and Staff    Start Time:  4/21/2022 11:57 AM  End Time:  4/21/2022 12:02 PM  Anesthesiologist:  Alex Bee MD  Performed by:   Anesthesiologist  Patient Location:  OR  Site identification: surface landmarks    Preanesthetic Checklist: patient identified, IV checked, risks and benefits discussed, monitors and equipment checked, pre-op evaluation, timeout performed, anesthesia consent and sterile technique used      Procedure Details    Patient Position:  Sitting  Prep: ChloraPrep    Monitoring:  Cardiac monitor, heart rate and continuous pulse ox  Approach:  Midline  Location:  L3-4  Injection Technique:  Single-shot    Needle    Needle Type:  Sprotte  Needle Gauge:  24 G  Needle Length:  3.5 in    Assessment    Sensory Level:   Events: clear CSF, CSF aspirated, well tolerated and blood negative     Additional Comments

## 2022-04-21 NOTE — INTERVAL H&P NOTE
Pre-op Diagnosis: Primary osteoarthritis of left knee [M17.12]    The above referenced H&P was reviewed by Geneva Pyle MD on 4/21/2022, the patient was examined and no significant changes have occurred in the patient's condition since the H&P was performed. I discussed with the patient and/or legal representative the potential benefits, risks and side effects of this procedure; the likelihood of the patient achieving goals; and potential problems that might occur during recuperation. I discussed reasonable alternatives to the procedure, including risks, benefits and side effects related to the alternatives and risks related to not receiving this procedure. We will proceed with procedure as planned.

## 2022-04-21 NOTE — OPERATIVE REPORT
PATIENT'S NAME: Yasmeen Lopez   ATTENDING PHYSICIAN: Angel Davis MD   OPERATING PHYSICIAN: Angel Davis MD   PATIENT ACCOUNT#:   [de-identified]    LOCATION:  [address] [place] [address],[address] RECORD #:   [id number]    YOB: 1965  ADMISSION DATE:  4/21/2022           OPERATION DATE:  4/21/2022     OPERATIVE REPORT     PREOPERATIVE DIAGNOSIS:  Left knee primary osteoarthritis. Morbid obesity BMI over 41  POSTOPERATIVE DIAGNOSIS: Left knee primary osteoarthritis. Morbid obesity BMI over 41  PROCEDURE PERFORMED:  Left total knee arthroplasty Attune 5 cemented femur, cemented size 5 tibia, 7 mm rotating polyethylene, and cemented size 32 all-polyethylene patella. Increased difficulty due to morbid obesity BMI over 41     ASSISTANT:  CARLITO Mari     ANESTHESIA:  Spinal with regional block. INDICATIONS:  This is a 61 yrs old Marcia female who presents with ongoing pain and difficulties with the knee consistent with the above diagnosis with failed conservative care. I reviewed the indications and benefits of the procedure. The patient understood and agreed to proceed. PROCEDURE:  The patient was taken to the operating room and placed in the supine position after administration of spinal anesthesia. A regional block was performed at the lower extremity. A tourniquet was placed at the thigh, which later was inflated to 300 mmHg. A time-out was performed confirming the left knee was the appropriate operative site. The patient received IV antibiotics preoperatively. The knee was prepped and draped in the usual sterile fashion. A knife was used to make a longitudinal incision at the anterior aspect of the knee. The Bovie was used for subcutaneous dissection and hemostasis. Dissection was carried down to the extensor mechanism, which was incised in line with the quadriceps tendon, carried medial to patella and medial to patellar tendon.   Upon entering the joint, normal joint fluid was produced. Soft tissue dissection was carried out and osteophytes were removed until the joint was adequately visualized. Irrisept irrigation was placed in the wound and allowed to sit for 1 minute before evacuating the solution. The knee was then flexed and the tibia subluxed anteriorly. The drill was used to gain access to the intramedullary canal.  The intramedullary joselin with outrigger for cutting guide was placed down the canal.  The stylus was used off the lateral tibial plateau. The guide was pinned in position. The saw was used to resect the proximal tibia without difficulty. The knee was brought into extension to confirm ligament balance in extension before turning attention to the femur. The drill was used to gain access to the intramedullary canal.  The intramedullary joselin, with 5-degree angle, with the distal cutting guide was placed down the canal and pinned in place. The saw was used to resect the distal femur without difficulty. The lollipop was then used to confirm extension gap at 7 mm. The sizing guide was then placed to the distal femur. The size was determined to be a 5, and the drill pins placed. The cutting guide was placed and pinned in position. The anterior and posterior femur cuts were made. The lollipop was used to confirm flexion gap was equal to the extension gap before finishing the chamfer cuts and removing the guide. The finishing guide was then placed and pinned in position. The notch cut was made before removing the guide. Attention was turned to the posterior aspect where the osteophytes were removed and soft tissue was released before turning attention back to the tibia. The size 5 tibial trial was found to be an appropriate fit with good coverage and no overhang. The guide was pinned in place with the tower. The reamer was used followed by the punch, which was left in place for trialing. The femur was impacted in place, placing the trial polyethylene.   The knee was brought into full extension with good flexion and good ligament balance throughout. Attention was turned to the patella. The caliper was used to measure initial thickness before using the saw in a freehand technique to resect the articular surface of the patella. A size 32 was the appropriate fit and this was placed. Drill holes were made for the patella, which were then placed and tracked nicely with the trial femur. The drill holes were then made for the trial femur before removing all trial implants. The bone was prepared for cementing with pulse lavage irrigation. A bone plug was placed to the distal femur. The final implants were opened on the back table and the cement was mixed. When the cement was ready, it was finger-packed into position at the proximal tibia before placing the cement-coated tibial implant, impacting it in place, and removing excess cement. The femur was placed with cement in the same fashion before placing the final polyethylene and bringing the knee into extension, placing the foot on the Pierre to compress the implants while the cement hardened. The patella was placed in the same fashion with cement. A clamp was used to hold it in place while the cement hardened. The knee was then copiously irrigated with pulse lavage irrigation before placing Irrisept into the wound and allowing it to sit for 1 minute before evacuating the solution. When the cement had hardened, the knee was flexed to confirm no further cement needed to be removed. The knee was put through good range of motion with good ligament balance and good patellar tracking. After further irrigation, closure was performed. A Quill suture was used in a running fashion to close the extensor mechanism. A #2 Orthocord suture was used in a figure-of-eight fashion at the medial parapatellar region to reinforce the repair. When the repair was complete, the knee was flexed to confirm stable repair.   Subcutaneous tissue was then closed with inverted 2-0 Vicryl suture and the skin was closed with staples. A sterile dressing was placed. The patient tolerated the procedure well. There were no complications. Blood loss was approximately 20 mL. Tourniquet time was approximately 75 minutes. The patient was taken to the recovery room in stable condition. Procedure was with significant increased difficulty due to morbid obesity, BMI 41.43. There was increased time and effort required for positioning, exposure, placing the implants and closure.

## 2022-04-22 VITALS
TEMPERATURE: 98 F | SYSTOLIC BLOOD PRESSURE: 156 MMHG | OXYGEN SATURATION: 92 % | WEIGHT: 264.56 LBS | BODY MASS INDEX: 41.52 KG/M2 | RESPIRATION RATE: 18 BRPM | HEART RATE: 65 BPM | HEIGHT: 67 IN | DIASTOLIC BLOOD PRESSURE: 71 MMHG

## 2022-04-22 LAB
HCT VFR BLD AUTO: 39.2 %
HGB BLD-MCNC: 12.4 G/DL

## 2022-04-22 PROCEDURE — 97161 PT EVAL LOW COMPLEX 20 MIN: CPT

## 2022-04-22 PROCEDURE — 97165 OT EVAL LOW COMPLEX 30 MIN: CPT

## 2022-04-22 PROCEDURE — 97116 GAIT TRAINING THERAPY: CPT

## 2022-04-22 PROCEDURE — 85018 HEMOGLOBIN: CPT | Performed by: PHYSICIAN ASSISTANT

## 2022-04-22 PROCEDURE — 97535 SELF CARE MNGMENT TRAINING: CPT

## 2022-04-22 PROCEDURE — 85014 HEMATOCRIT: CPT | Performed by: PHYSICIAN ASSISTANT

## 2022-04-22 NOTE — PLAN OF CARE
Patient is alert and oriented x 4. On RA, states she does not use CPAP at home anymore. SL. Voiding freely per bedpan. Incision covered with aquacel dressing - clean, dry, intact. Ace wrap over. Patient rates pain at 2/10, sensation to LLE is intact, strength 4/5, able to do leg raise. Ice gel applied. SCDs, Worked with Physical Therapy - cleared to go home today. IS and ankle pumps encouraged, call light within reach and encouraged to call for assistance. All safety precautions and alarms in place.

## 2022-04-22 NOTE — PLAN OF CARE
Aox4, reporting tingling and decreased sensation to LLE, reporting pain that is controlled by ordered pain medications, aquacel dressing c/d/I, Spandigrip to LLE, on 3L O2 , scds in place, on ASA but normally takes Xarelto, voiding, WBAT, PT/OT to see, no further needs at this time, will continue to monitor.

## 2022-04-22 NOTE — PROGRESS NOTES
Reviewed indications, side effects of pain medication/narcotics and constipation prevention. Stressed importance of increased fluids/roughage in diet, continued use of stool softeners along with laxatives and suppositories as needed while taking narcotics even when at home. Pt verbalized understanding. Teachback done on ankle pumps and incentive spirometry use 10 times every hour w/a for each. Reviewed spandagrip,dressing changes, showering, elevation and cold therapy. Reviewed discharge plan with patient. Solicited and answered any questions regarding care.

## 2022-11-10 NOTE — PLAN OF CARE
Patient is alert and oriented x 4. On 3L per nasal canula, states she does not use CPAP at home. IVF infusing at 100 ml/hr. Voiding freely per bedpan. Incision covered with aquacel dressing - clean, dry, intact. Ace wrap over. Patient rates pain at 7/10, drowsy and falls asleep easily, sensation to LLE is decreased from the nerve block. Ice gel applied. SCDs, To be seen by Physical Therapy. IS and ankle pumps encouraged, call light within reach and encouraged to call for assistance. All safety precautions and alarms in place. [FreeTextEntry1] : From a cardiac standpoint, Mrs. Wright feels well denying exertional chest pain, shortness of breath, or palpitations.  She states that she has had issues with left knee swelling and has been noted to have an effusion which was recently drained.  Patient’s exercise tolerance is thus limited.  \par

## (undated) DEVICE — STERILE PATIENT PROTECTIVE PAD FOR IMP® KNEE POSITIONERS & COHESIVE WRAP (10 / CASE): Brand: DE MAYO KNEE POSITIONER®

## (undated) DEVICE — BOWL CEMENT MIX QUICK-VAC

## (undated) DEVICE — UNDYED BRAIDED (POLYGLACTIN 910), SYNTHETIC ABSORBABLE SUTURE: Brand: COATED VICRYL

## (undated) DEVICE — HOOK LOCK LATEX FREE ELASTIC BANDAGE 6INX5YD

## (undated) DEVICE — DERMABOND LIQUID ADHESIVE

## (undated) DEVICE — SCD SLEEVE KNEE HI BLEND

## (undated) DEVICE — LIGHT HANDLE

## (undated) DEVICE — Device: Brand: DEFENDO AIR/WATER/SUCTION AND BIOPSY VALVE

## (undated) DEVICE — CONT SPEC SURG FAXITRON WEDGE

## (undated) DEVICE — SIGMA LCS HIGH PERFORMANCE STERILE THREADED HEADED PINS: Brand: SIGMA LCS HIGH PERFORMANCE

## (undated) DEVICE — STANDARD HYPODERMIC NEEDLE,POLYPROPYLENE HUB: Brand: MONOJECT

## (undated) DEVICE — SYRINGE 30ML LL TIP

## (undated) DEVICE — ENDOSCOPY PACK - LOWER: Brand: MEDLINE INDUSTRIES, INC.

## (undated) DEVICE — SOL  .9 1000ML BTL

## (undated) DEVICE — BREAST-HERNIA-PORT CDS-LF: Brand: MEDLINE INDUSTRIES, INC.

## (undated) DEVICE — SUTURE NABSB OTHCRD 2 OS-6

## (undated) DEVICE — DRAPE,U/SHT,SPLIT,FILM,60X84,STERILE: Brand: MEDLINE

## (undated) DEVICE — STERILE POLYISOPRENE POWDER-FREE SURGICAL GLOVES: Brand: PROTEXIS

## (undated) DEVICE — Device

## (undated) DEVICE — DISPOSABLE TOURNIQUET CUFF SINGLE BLADDER, DUAL PORT AND QUICK CONNECT CONNECTOR: Brand: COLOR CUFF

## (undated) DEVICE — SUTURE VICRYL 3-0 SH

## (undated) DEVICE — PADDING CAST COTTON  4

## (undated) DEVICE — CHLORAPREP 26ML APPLICATOR

## (undated) DEVICE — Device: Brand: STABLECUT®

## (undated) DEVICE — TOTAL KNEE CDS: Brand: MEDLINE INDUSTRIES, INC.

## (undated) DEVICE — HOOD: Brand: FLYTE

## (undated) DEVICE — SUTURE VICRYL 2-0 CP-1

## (undated) DEVICE — SIGMA LCS HIGH PERFORMANCE INSTRUMENTS STERILE THREADED PINS: Brand: SIGMA LCS HIGH PERFORMANCE

## (undated) DEVICE — BRA SURGICAL ELIZABETH PINK XL

## (undated) DEVICE — LIGACLIP EXTRA LIGATING CLIP CARTRIDGES: 6 TITANIUM CLIPS/ CARTRIDGE (SMALL): Brand: LIGACLIP

## (undated) DEVICE — 1200CC GUARDIAN II: Brand: GUARDIAN

## (undated) DEVICE — FORCEP BIOPSY RJ4 LG CAP W/ND

## (undated) DEVICE — SUTURE SILK 2-0 FSL

## (undated) DEVICE — HEMOCLIP HORIZON MED 002200

## (undated) DEVICE — HOOD, PEEL-AWAY: Brand: FLYTE

## (undated) DEVICE — WRAP COOLING KNEE W/ICE PILLOW

## (undated) DEVICE — SPECIMEN CONTAINER,POSITIVE SEAL INDICATOR, OR PACKAGED: Brand: PRECISION

## (undated) DEVICE — KENDALL SCD EXPRESS SLEEVES, KNEE LENGTH, MEDIUM: Brand: KENDALL SCD

## (undated) DEVICE — CONVERTORS STOCKINETTE: Brand: CONVERTORS

## (undated) DEVICE — SUTURE VICRYL 2-0

## (undated) DEVICE — 2T11 #2 PDO 36 X 36: Brand: 2T11 #2 PDO 36 X 36

## (undated) DEVICE — GAMMEX® NON-LATEX PI TEXTURED SIZE 6.5, STERILE POLYISOPRENE POWDER-FREE SURGICAL GLOVE: Brand: GAMMEX

## (undated) DEVICE — SOL  .9 1000ML BAG

## (undated) DEVICE — DRAPE HALF 40X58 DYNJP2410

## (undated) DEVICE — 450 ML BOTTLE OF 0.05% CHLORHEXIDINE GLUCONATE IN 99.95% STERILE WATER FOR IRRIGATION, USP AND APPLICATOR.: Brand: IRRISEPT ANTIMICROBIAL WOUND LAVAGE

## (undated) DEVICE — FILTERLINE NASAL ADULT O2/CO2

## (undated) DEVICE — STERILE SYNTHETIC POLYISOPRENE POWDER-FREE SURGICAL GLOVES WITH HYDROGEL COATING, SMOOTH FINISH, STRAIGHT FINGER: Brand: PROTEXIS

## (undated) DEVICE — SUTURE SILK 0 FSL

## (undated) DEVICE — GOWN,SIRUS,FABRIC-REINFORCED,X-LARGE: Brand: MEDLINE

## (undated) DEVICE — CHLORAPREP ORANGE TINT 10.5ML

## (undated) NOTE — LETTER
[facility] Department  Phone: [phone]  OUTSIDE TESTING RESULT REQUEST      TO:   DR [employer]     Today's Date: 4/9/19    FAX #: [phone]     IMPORTANT: FOR YOUR IMMEDIATE ATTENTION  Please FAX all test results listed below to: 6

## (undated) NOTE — LETTER
Carrol Valenzuela [address]  [place], [address] [facility]    Consent for Operation  Date: __________________                                Time: _______________    1.  I authorize the performance upon Celso Johnson the following operation:  Proced procedure has been videotaped, the surgeon will obtain the original videotape. The hospital will not be responsible for storage or maintenance of this tape.   7. For the purpose of advancing medical education, I consent to the admittance of observers to the STATEMENTS REQUIRING INSERTION OR COMPLETION WERE FILLED IN.     Signature of Patient:   ___________________________    When the patient is a minor or mentally incompetent to give consent:  Signature of person authorized to consent for patient: ____________ supplements, and pills I can buy without a prescription (including street drugs/illegal medications). Failure to inform my anesthesiologist about these medicines may increase my risk of anesthetic complications. iv.  If I am allergic to anything or have ha Anesthesiologist Signature     Date   Time  I have discussed the procedure and information above with the patient (or patient’s representative) and answered their questions. The patient or their representative has agreed to have anesthesia services.     ___

## (undated) NOTE — LETTER
10/21/2018          Nakia Cejajulia  [address] [address]    Dear Trey Sanches,       Here are the  biopsy/pathology findings from your recent Colonoscopy :     Three small polyps were completely removed at the time of your colonos